# Patient Record
Sex: FEMALE | Race: WHITE | Employment: UNEMPLOYED | URBAN - METROPOLITAN AREA
[De-identification: names, ages, dates, MRNs, and addresses within clinical notes are randomized per-mention and may not be internally consistent; named-entity substitution may affect disease eponyms.]

---

## 2019-08-30 NOTE — H&P (VIEW-ONLY)
Anjel Navarrete  1980                     ASSESSMENT:   1. Left 1.2cm and 1cm distal ureteral stones    2. Right 8mm non-obstructing renal stone    Recommended urgent/semi-elective treatment given she has been obstructed for ~3 weeks now. Consider treating contralateral side if she is ok with possibility of bilateral stents. Urine is negative, will send for culture today to document no growth. PLAN:    1. Plan for next available URS with laser lithotripsy and stone extraction. 2. Urine today sent for culture  3. RTC post-op     Follow-up and Dispositions    · Return for Post-Op. DISCUSSION:  Risks and benefits of ureteroscopy were reviewed including but not limited to infection, bleeding, pain, ureteral injury, persistent stone disease, requirement for staged procedure, likely need for stent, and global anesthesia risks. Reviewed stent removal as well involving in office flexible cystoscopy procedure. Patient expressed understanding and desires to proceed with ureteroscopy. Chief Complaint   Patient presents with    Kidney Stone     rv ct       HISTORY OF PRESENT ILLNESS:  Anjel Navarrete is a 34 y.o. female who is seen in follow up of left distal ureteral stone first on CT scan 8/14/19. CT with two large distal ureteral stones, one measuring 1.2 cm and one measuring ~1cm. Returns today to discuss treatment of her ureteral stones. Today she reports that she has been doing well, no pain since she was seen by Dr. Kilo Maldonado 8/7/19. She has not seen any stones pass. She has passed two stones in the past. She is concerned as she is going on vacation 9/14/19. She denies flank pain, abdominal pain, dysuria, gross hematuria, dysuria, urinary frequency, urinary urgency, or f/c/n/v.       REVIEW OF LABS AND IMAGING:    CT abd/pelv 8/14/19: IMPRESSION     2 large stones in the distal left ureter, larger measuring 12 x 9 mm. Mild hydronephrosis and moderate ureterectasis.  Smaller intrarenal calculi bilaterally. Results for orders placed or performed in visit on 19   AMB POC URINALYSIS DIP STICK AUTO W/O MICRO   Result Value Ref Range    Color (UA POC) Yellow     Clarity (UA POC) Clear     Glucose (UA POC) Negative Negative    Bilirubin (UA POC) Negative Negative    Ketones (UA POC) Negative Negative    Specific gravity (UA POC) 1.020 1.001 - 1.035    Blood (UA POC) Trace Negative    pH (UA POC) 5.5 4.6 - 8.0    Protein (UA POC) Negative Negative    Urobilinogen (UA POC) 0.2 mg/dL 0.2 - 1    Nitrites (UA POC) Negative Negative    Leukocyte esterase (UA POC) Negative Negative       CT Results:  Results from Abstract encounter on 19   CT ABD PELV WO CONT    Impression [address] Cat Scan  [address] [address]  [phone]      Imaging Result     Name:  Chari Mena ([id number])    Sex: Female :  1980 Ordering Provider: Nereyda AMAYA Provider:   Diagnosis:  Kidney stones [N20.0 (ICD-10-CM)]    None Specified    Procedures Performed:  CT ABD/PELVIS-NO ORAL/IV  [id number] Exam Date/Time:  2019 10:29 AM            EXAM: CT ABDOMEN AND PELVIS      CLINICAL INDICATION/HISTORY:  N20.0: Kidney stones.  -Additional: Left-sided nephrolithiasis suspected on prior radiograph.     COMPARISON: Not available.     TECHNIQUE: Axial CT imaging of the abdomen and pelvis was performed without intravenous contrast. Multiplanar reformats were generated. One or more dose reduction techniques were used on this CT: automated exposure control, adjustment of the mAs and/or kVp according to patient size, and iterative reconstruction techniques. The specific techniques used on this CT exam have been documented in the patient's electronic medical record.  Digital Imaging and Communications in Medicine (DICOM) format image data are available to nonaffiliated external healthcare facilities or entities on a secure, media free, reciprocally searchable basis with patient authorization for at least a 12-month period after this study.     _______________     FINDINGS:     LOWER CHEST: Unremarkable.     LIVER, BILIARY: Liver is normal. No biliary dilation. Gallbladder is unremarkable.     SPLEEN: Normal.     PANCREAS: Normal.     ADRENALS: Normal.     KIDNEYS/URETERS/BLADDER:     > 12 x 9 mm stone distal left ureter (image 121), measures less than 1000 HU.    > Slightly more distal left ureteral stone (image 126) measuring 7 mm, greater than 1000 HU. > Associated mild left-sided hydronephrosis and moderate ureterectasis. > 5 mm intrarenal calculus in the upper pole of the left kidney. > 4 mm intrarenal calculus interpolar right kidney with no hydronephrosis.     LYMPH NODES: No enlarged lymph nodes.     GASTROINTESTINAL TRACT: No bowel dilation or wall thickening.     VASCULATURE: Unremarkable.     PELVIC ORGANS: Unremarkable.     BONES: No acute or aggressive osseous abnormalities identified.     OTHER: None.     _______________     IMPRESSION     2 large stones in the distal left ureter, larger measuring 12 x 9 mm. Mild hydronephrosis and moderate ureterectasis. Smaller intrarenal calculi bilaterally.     Signed By: Neel Whipple MD on 8/14/2019 2:45 PM      Dictated by: Richmond Juárez on Wed Aug 14, 2019  2:24:08 PM EDT    Signed By:Coleman Merida MD     8/14/2019  2:45 PM     ~~This report was copied and pasted from the servicing EHR system. ~~         US Results:  No results found for this or any previous visit. No past medical history on file. No past surgical history on file. Social History     Tobacco Use    Smoking status: Never Smoker    Smokeless tobacco: Never Used   Substance Use Topics    Alcohol use: Not Currently    Drug use: Not on file       Allergies   Allergen Reactions    Bactrim [Sulfamethoprim] Other (comments)       No family history on file.     Current Outpatient Medications Medication Sig Dispense Refill    loratadine (CLARITIN) 10 mg tablet Take 10 mg by mouth.  fluticasone propionate (FLONASE ALLERGY RELIEF) 50 mcg/actuation nasal spray 2 Sprays by Both Nostrils route daily. Review of Systems  Constitutional: Fever: No  Skin: Rash: No  HEENT: Hearing difficulty: No  Eyes: Blurred vision: No  Cardiovascular: Chest pain: No  Respiratory: Shortness of breath: No  Gastrointestinal: Nausea/vomiting: No  Musculoskeletal: Back pain: No  Neurological: Weakness: No  Psychological: Memory loss: No  Comments/additional findings:         PHYSICAL EXAMINATION:     Visit Vitals  /60   Ht 5' 1\" (1.549 m)   Wt 105 lb (47.6 kg)   LMP 08/07/2019   BMI 19.84 kg/m²     Constitutional: Well developed, well-nourished female in no acute distress. CV:  No peripheral swelling noted  Respiratory: No respiratory distress or difficulties  Abdomen:  Soft and nontender. No masses. No hepatosplenomegaly.  Female:  No CVA tenderness. Skin:  Normal color. No evidence of jaundice. Neuro/Psych:  Patient with appropriate affect. Alert and oriented. Lymphatic:   No enlargement of supraclavicular lymph nodes. Labs Today:  Recent Results (from the past 12 hour(s))   AMB POC URINALYSIS DIP STICK AUTO W/O MICRO    Collection Time: 08/30/19  4:00 PM   Result Value Ref Range    Color (UA POC) Yellow     Clarity (UA POC) Clear     Glucose (UA POC) Negative Negative    Bilirubin (UA POC) Negative Negative    Ketones (UA POC) Negative Negative    Specific gravity (UA POC) 1.020 1.001 - 1.035    Blood (UA POC) Trace Negative    pH (UA POC) 5.5 4.6 - 8.0    Protein (UA POC) Negative Negative    Urobilinogen (UA POC) 0.2 mg/dL 0.2 - 1    Nitrites (UA POC) Negative Negative    Leukocyte esterase (UA POC) Negative Negative        A copy of today's office visit with all pertinent imaging results and labs were sent to the referring physician.         Panda Pace MD   [facility] [address]        Encounter Diagnoses     ICD-10-CM ICD-9-CM   1. Kidney stone N20.0 592.0          Medical documentation provided with the assistance of Karley Qiu. Yevgeniy Merlos medical scribe for Padma Riley MD on 8/30/2019.

## 2019-09-03 ENCOUNTER — APPOINTMENT (OUTPATIENT)
Dept: GENERAL RADIOLOGY | Age: 39
End: 2019-09-03
Attending: UROLOGY
Payer: COMMERCIAL

## 2019-09-03 ENCOUNTER — ANESTHESIA (OUTPATIENT)
Dept: SURGERY | Age: 39
End: 2019-09-03
Payer: COMMERCIAL

## 2019-09-03 ENCOUNTER — ANESTHESIA EVENT (OUTPATIENT)
Dept: SURGERY | Age: 39
End: 2019-09-03
Payer: COMMERCIAL

## 2019-09-03 ENCOUNTER — HOSPITAL ENCOUNTER (OUTPATIENT)
Age: 39
Setting detail: OUTPATIENT SURGERY
Discharge: HOME | End: 2019-09-03
Attending: UROLOGY | Admitting: UROLOGY
Payer: COMMERCIAL

## 2019-09-03 VITALS
OXYGEN SATURATION: 98 % | HEIGHT: 61 IN | SYSTOLIC BLOOD PRESSURE: 117 MMHG | RESPIRATION RATE: 16 BRPM | DIASTOLIC BLOOD PRESSURE: 65 MMHG | HEART RATE: 97 BPM | BODY MASS INDEX: 19.71 KG/M2 | TEMPERATURE: 99.4 F | WEIGHT: 104.38 LBS

## 2019-09-03 DIAGNOSIS — N20.1 URETEROLITHIASIS: Primary | ICD-10-CM

## 2019-09-03 LAB
ANION GAP SERPL CALC-SCNC: 8 MMOL/L (ref 3–18)
BUN BLD-MCNC: 14 MG/DL (ref 7–18)
BUN BLD-MCNC: 14 MG/DL (ref 7–18)
BUN SERPL-MCNC: 12 MG/DL (ref 7–18)
BUN/CREAT SERPL: 26 (ref 12–20)
CALCIUM SERPL-MCNC: 8.9 MG/DL (ref 8.5–10.1)
CHLORIDE BLD-SCNC: 106 MMOL/L (ref 100–108)
CHLORIDE BLD-SCNC: 106 MMOL/L (ref 100–108)
CHLORIDE SERPL-SCNC: 109 MMOL/L (ref 100–111)
CO2 SERPL-SCNC: 25 MMOL/L (ref 21–32)
CREAT SERPL-MCNC: 0.47 MG/DL (ref 0.6–1.3)
GLUCOSE BLD STRIP.AUTO-MCNC: 88 MG/DL (ref 74–106)
GLUCOSE BLD STRIP.AUTO-MCNC: 89 MG/DL (ref 74–106)
GLUCOSE SERPL-MCNC: 83 MG/DL (ref 74–99)
HCG UR QL: NEGATIVE
HCT VFR BLD CALC: 37 % (ref 36–49)
HCT VFR BLD CALC: 38 % (ref 36–49)
HGB BLD-MCNC: 12.6 G/DL (ref 12–16)
HGB BLD-MCNC: 12.9 G/DL (ref 12–16)
POTASSIUM BLD-SCNC: 6.3 MMOL/L (ref 3.5–5.5)
POTASSIUM BLD-SCNC: 6.3 MMOL/L (ref 3.5–5.5)
POTASSIUM SERPL-SCNC: 4.1 MMOL/L (ref 3.5–5.5)
SODIUM BLD-SCNC: 137 MMOL/L (ref 136–145)
SODIUM BLD-SCNC: 138 MMOL/L (ref 136–145)
SODIUM SERPL-SCNC: 142 MMOL/L (ref 136–145)

## 2019-09-03 PROCEDURE — 74011000250 HC RX REV CODE- 250: Performed by: ANESTHESIOLOGY

## 2019-09-03 PROCEDURE — 82947 ASSAY GLUCOSE BLOOD QUANT: CPT

## 2019-09-03 PROCEDURE — 76060000035 HC ANESTHESIA 2 TO 2.5 HR: Performed by: UROLOGY

## 2019-09-03 PROCEDURE — 76010000171 HC OR TIME 2 TO 2.5 HR INTENSV-TIER 1: Performed by: UROLOGY

## 2019-09-03 PROCEDURE — 76210000020 HC REC RM PH II FIRST 0.5 HR: Performed by: UROLOGY

## 2019-09-03 PROCEDURE — 77030010509 HC AIRWY LMA MSK TELE -A: Performed by: ANESTHESIOLOGY

## 2019-09-03 PROCEDURE — C1758 CATHETER, URETERAL: HCPCS | Performed by: UROLOGY

## 2019-09-03 PROCEDURE — 77030018836 HC SOL IRR NACL ICUM -A: Performed by: UROLOGY

## 2019-09-03 PROCEDURE — 77030012961 HC IRR KT CYSTO/TUR ICUM -A: Performed by: UROLOGY

## 2019-09-03 PROCEDURE — 74011250636 HC RX REV CODE- 250/636

## 2019-09-03 PROCEDURE — 74011250636 HC RX REV CODE- 250/636: Performed by: UROLOGY

## 2019-09-03 PROCEDURE — 82360 CALCULUS ASSAY QUANT: CPT

## 2019-09-03 PROCEDURE — 74011250636 HC RX REV CODE- 250/636: Performed by: ANESTHESIOLOGY

## 2019-09-03 PROCEDURE — 77030019903 HC CATH URET INT BARD -A: Performed by: UROLOGY

## 2019-09-03 PROCEDURE — 76210000006 HC OR PH I REC 0.5 TO 1 HR: Performed by: UROLOGY

## 2019-09-03 PROCEDURE — 77030020268 HC MISC GENERAL SUPPLY: Performed by: UROLOGY

## 2019-09-03 PROCEDURE — 77030019927 HC TBNG IRR CYSTO BAXT -A: Performed by: UROLOGY

## 2019-09-03 PROCEDURE — C1769 GUIDE WIRE: HCPCS | Performed by: UROLOGY

## 2019-09-03 PROCEDURE — 80048 BASIC METABOLIC PNL TOTAL CA: CPT

## 2019-09-03 PROCEDURE — 74011000250 HC RX REV CODE- 250: Performed by: UROLOGY

## 2019-09-03 PROCEDURE — 74011636320 HC RX REV CODE- 636/320: Performed by: UROLOGY

## 2019-09-03 PROCEDURE — 77030020015 HC EVAC BLDR UROVAC BSC -B: Performed by: UROLOGY

## 2019-09-03 PROCEDURE — C2617 STENT, NON-COR, TEM W/O DEL: HCPCS | Performed by: UROLOGY

## 2019-09-03 PROCEDURE — C1894 INTRO/SHEATH, NON-LASER: HCPCS | Performed by: UROLOGY

## 2019-09-03 PROCEDURE — 77030004561 HC CATH ANGI DX COBRA ANGI -B: Performed by: UROLOGY

## 2019-09-03 PROCEDURE — 81025 URINE PREGNANCY TEST: CPT

## 2019-09-03 PROCEDURE — 77030020782 HC GWN BAIR PAWS FLX 3M -B: Performed by: UROLOGY

## 2019-09-03 PROCEDURE — 74011250636 HC RX REV CODE- 250/636: Performed by: NURSE ANESTHETIST, CERTIFIED REGISTERED

## 2019-09-03 PROCEDURE — 77030038846 HC SCPE URETSCP LITHOVUE DISP BSC -H: Performed by: UROLOGY

## 2019-09-03 PROCEDURE — 74420 UROGRAPHY RTRGR +-KUB: CPT

## 2019-09-03 PROCEDURE — 77030006974 HC BSKT URET RTVR BSC -C: Performed by: UROLOGY

## 2019-09-03 DEVICE — URETERAL STENT
Type: IMPLANTABLE DEVICE | Site: URETER | Status: FUNCTIONAL
Brand: POLARIS™ ULTRA

## 2019-09-03 RX ORDER — ONDANSETRON 2 MG/ML
INJECTION INTRAMUSCULAR; INTRAVENOUS AS NEEDED
Status: DISCONTINUED | OUTPATIENT
Start: 2019-09-03 | End: 2019-09-03 | Stop reason: HOSPADM

## 2019-09-03 RX ORDER — DEXAMETHASONE SODIUM PHOSPHATE 4 MG/ML
INJECTION, SOLUTION INTRA-ARTICULAR; INTRALESIONAL; INTRAMUSCULAR; INTRAVENOUS; SOFT TISSUE AS NEEDED
Status: DISCONTINUED | OUTPATIENT
Start: 2019-09-03 | End: 2019-09-03 | Stop reason: HOSPADM

## 2019-09-03 RX ORDER — OXYCODONE AND ACETAMINOPHEN 5; 325 MG/1; MG/1
1-2 TABLET ORAL
Qty: 15 TAB | Refills: 0 | Status: SHIPPED | OUTPATIENT
Start: 2019-09-03 | End: 2019-09-06

## 2019-09-03 RX ORDER — ONDANSETRON 2 MG/ML
4 INJECTION INTRAMUSCULAR; INTRAVENOUS ONCE
Status: COMPLETED | OUTPATIENT
Start: 2019-09-03 | End: 2019-09-03

## 2019-09-03 RX ORDER — PROPOFOL 10 MG/ML
INJECTION, EMULSION INTRAVENOUS AS NEEDED
Status: DISCONTINUED | OUTPATIENT
Start: 2019-09-03 | End: 2019-09-03 | Stop reason: HOSPADM

## 2019-09-03 RX ORDER — FENTANYL CITRATE 50 UG/ML
INJECTION, SOLUTION INTRAMUSCULAR; INTRAVENOUS AS NEEDED
Status: DISCONTINUED | OUTPATIENT
Start: 2019-09-03 | End: 2019-09-03 | Stop reason: HOSPADM

## 2019-09-03 RX ORDER — MIDAZOLAM HYDROCHLORIDE 1 MG/ML
INJECTION, SOLUTION INTRAMUSCULAR; INTRAVENOUS AS NEEDED
Status: DISCONTINUED | OUTPATIENT
Start: 2019-09-03 | End: 2019-09-03 | Stop reason: HOSPADM

## 2019-09-03 RX ORDER — KETOROLAC TROMETHAMINE 15 MG/ML
15 INJECTION, SOLUTION INTRAMUSCULAR; INTRAVENOUS AS NEEDED
Status: COMPLETED | OUTPATIENT
Start: 2019-09-03 | End: 2019-09-03

## 2019-09-03 RX ORDER — LIDOCAINE HYDROCHLORIDE 20 MG/ML
INJECTION, SOLUTION EPIDURAL; INFILTRATION; INTRACAUDAL; PERINEURAL AS NEEDED
Status: DISCONTINUED | OUTPATIENT
Start: 2019-09-03 | End: 2019-09-03 | Stop reason: HOSPADM

## 2019-09-03 RX ORDER — FENTANYL CITRATE 50 UG/ML
50 INJECTION, SOLUTION INTRAMUSCULAR; INTRAVENOUS
Status: DISCONTINUED | OUTPATIENT
Start: 2019-09-03 | End: 2019-09-03 | Stop reason: HOSPADM

## 2019-09-03 RX ORDER — TAMSULOSIN HYDROCHLORIDE 0.4 MG/1
0.4 CAPSULE ORAL DAILY
Qty: 30 CAP | Refills: 3 | Status: SHIPPED | OUTPATIENT
Start: 2019-09-03

## 2019-09-03 RX ORDER — NITROFURANTOIN 25; 75 MG/1; MG/1
100 CAPSULE ORAL 2 TIMES DAILY
Qty: 14 CAP | Refills: 0 | Status: SHIPPED | OUTPATIENT
Start: 2019-09-03 | End: 2019-09-10

## 2019-09-03 RX ORDER — OXYBUTYNIN CHLORIDE 10 MG/1
10 TABLET, EXTENDED RELEASE ORAL
Qty: 20 TAB | Refills: 0 | Status: SHIPPED | OUTPATIENT
Start: 2019-09-03 | End: 2019-10-16

## 2019-09-03 RX ORDER — SODIUM CHLORIDE, SODIUM LACTATE, POTASSIUM CHLORIDE, CALCIUM CHLORIDE 600; 310; 30; 20 MG/100ML; MG/100ML; MG/100ML; MG/100ML
75 INJECTION, SOLUTION INTRAVENOUS CONTINUOUS
Status: DISCONTINUED | OUTPATIENT
Start: 2019-09-03 | End: 2019-09-03 | Stop reason: HOSPADM

## 2019-09-03 RX ADMIN — KETOROLAC TROMETHAMINE 15 MG: 15 INJECTION, SOLUTION INTRAMUSCULAR; INTRAVENOUS at 13:39

## 2019-09-03 RX ADMIN — FENTANYL CITRATE 50 MCG: 50 INJECTION, SOLUTION INTRAMUSCULAR; INTRAVENOUS at 12:58

## 2019-09-03 RX ADMIN — FENTANYL CITRATE 25 MCG: 50 INJECTION, SOLUTION INTRAMUSCULAR; INTRAVENOUS at 12:18

## 2019-09-03 RX ADMIN — ONDANSETRON 4 MG: 2 INJECTION INTRAMUSCULAR; INTRAVENOUS at 13:06

## 2019-09-03 RX ADMIN — FENTANYL CITRATE 25 MCG: 50 INJECTION, SOLUTION INTRAMUSCULAR; INTRAVENOUS at 12:03

## 2019-09-03 RX ADMIN — PROPOFOL 150 MG: 10 INJECTION, EMULSION INTRAVENOUS at 11:27

## 2019-09-03 RX ADMIN — FENTANYL CITRATE 50 MCG: 50 INJECTION, SOLUTION INTRAMUSCULAR; INTRAVENOUS at 11:26

## 2019-09-03 RX ADMIN — FENTANYL CITRATE 50 MCG: 50 INJECTION, SOLUTION INTRAMUSCULAR; INTRAVENOUS at 11:42

## 2019-09-03 RX ADMIN — FENTANYL CITRATE 25 MCG: 50 INJECTION, SOLUTION INTRAMUSCULAR; INTRAVENOUS at 12:33

## 2019-09-03 RX ADMIN — SODIUM CHLORIDE, SODIUM LACTATE, POTASSIUM CHLORIDE, AND CALCIUM CHLORIDE 75 ML/HR: 600; 310; 30; 20 INJECTION, SOLUTION INTRAVENOUS at 10:56

## 2019-09-03 RX ADMIN — MIDAZOLAM HYDROCHLORIDE 2 MG: 1 INJECTION, SOLUTION INTRAMUSCULAR; INTRAVENOUS at 11:22

## 2019-09-03 RX ADMIN — FAMOTIDINE 20 MG: 10 INJECTION, SOLUTION INTRAVENOUS at 10:56

## 2019-09-03 RX ADMIN — LIDOCAINE HYDROCHLORIDE 60 MG: 20 INJECTION, SOLUTION EPIDURAL; INFILTRATION; INTRACAUDAL; PERINEURAL at 11:26

## 2019-09-03 RX ADMIN — ONDANSETRON 4 MG: 2 INJECTION INTRAMUSCULAR; INTRAVENOUS at 13:41

## 2019-09-03 RX ADMIN — DEXAMETHASONE SODIUM PHOSPHATE 4 MG: 4 INJECTION, SOLUTION INTRA-ARTICULAR; INTRALESIONAL; INTRAMUSCULAR; INTRAVENOUS; SOFT TISSUE at 11:34

## 2019-09-03 RX ADMIN — WATER 1 G: 1 INJECTION INTRAMUSCULAR; INTRAVENOUS; SUBCUTANEOUS at 11:30

## 2019-09-03 RX ADMIN — FENTANYL CITRATE 25 MCG: 50 INJECTION, SOLUTION INTRAMUSCULAR; INTRAVENOUS at 12:45

## 2019-09-03 NOTE — INTERVAL H&P NOTE
H&P Update:  Renee Ambrocio was seen and examined. History and physical has been reviewed. The patient has been examined.  There have been no significant clinical changes since the completion of the originally dated History and Physical.

## 2019-09-03 NOTE — ANESTHESIA PREPROCEDURE EVALUATION
Relevant Problems   No relevant active problems       Anesthetic History   No history of anesthetic complications            Review of Systems / Medical History  Patient summary reviewed and pertinent labs reviewed    Pulmonary  Within defined limits                 Neuro/Psych   Within defined limits           Cardiovascular                  Exercise tolerance: >4 METS     GI/Hepatic/Renal         Renal disease: stones       Endo/Other  Within defined limits           Other Findings              Physical Exam    Airway  Mallampati: II  TM Distance: 4 - 6 cm  Neck ROM: normal range of motion   Mouth opening: Normal     Cardiovascular  Regular rate and rhythm,  S1 and S2 normal,  no murmur, click, rub, or gallop             Dental  No notable dental hx       Pulmonary  Breath sounds clear to auscultation               Abdominal  GI exam deferred       Other Findings            Anesthetic Plan    ASA: 1  Anesthesia type: general          Induction: Intravenous  Anesthetic plan and risks discussed with: Patient

## 2019-09-03 NOTE — OP NOTES
Operative Note  Patient: Carrie Luong               Sex: female             MRN: [id number]  YOB: 1980      Age:  34 y.o. Preoperative Diagnosis: bilateral kidney stones  Postoperative Diagnosis:  bilateral kidney stones  Surgeon: Saturnino Peterson     Indication: This is a 33 y/o woman with left 1cm obstructing distal stones x 2 as well as 7mm right mid pole stone here today for bilalateral ureteroscopic stone exctraction. Preop culture was negative. Procedure:    1) Cystoscopy  2) Retrograde pyelogram with interpretation  3) < 1 hour physician fluoroscopy imaging interpretation time  4) Bilateral side Ureteroscopy, laser lithotripsy and stone extraction   5) Bilateral JJ ureteral stent placement     Findings:    1). Normal cystoscopy   2). Retrograde pyelograms with 2 large distal left stones with massive proximal hydro-ureteronephrosis and cork-screwing ureter  3). Total stone burden 2cm on left, 7mm on right    4). 6x22 on right, 6x26 JJ stent placed on left     Narrative of events: The patient was brought to the operative suite. Anesthesia was induced and preoperative antibiotics were administered. They were then placed in the dorsal lithotomy position and their external genitalia was prepped and draped in the usual fashion. A surgical timeout was performed confirming the patient's name, date of birth, laterality, and antibiotics. All were in agreement. A 22 Burmese cystourethroscope was then inserted into the patients bladder. The urethra revealed no abnormalities. Thorough cystoscopy was performed with both the 30 degree and 70 degree lens. The left ureteral orifice was cannulated with a 0.035 sensor tip wire and immediately bounced off the stone. Tried a glide wire which wouldn't pass. Then tried a tigertail with angled glide which wouldn't pass. Eventually using a combo of a kumpe catheter and angled glide wire, was able to get past the stone.   At this point the glide wire curled in the mid ureter loop and again required guidance with a kumpe to gain access. There was yet a 3rd loop in the proximal uretera/UPJ which eventually passed into the renal pelvis. The tigertail was passed over this and then a super stiff wire was advanced to straighten the ureter. At this point, the wire was secured as a safety and rigid ureteroscopy was performed. The stones were engaged in the distal ureter and laser lithotripsy was performed. Basket extraction was then performed. The fragments were all removed. A duel lumen was advanced over this and retrograde pyelogram was performed revealing the hydro was improving and ureter had now been straightened. Second wire was advanced and duel lumen was removed. 13/15 Ureteral access sheath was advanced to the UPJ under direct fluoroscopic guidance and flexible ureteroscopy was performed. Some small lower pole stones were identified and laser lithotripsy was performed. Basket extraction was then performed with removal of all visible stone. Thorough pyeloscopy was again performed to ensure no residual fragments. The sheath was removed slowly and the entire ureter was well visualized without evidence of residual stones or injury. The wire was kept in situ for later stent placement. I then cannulated the right side, used a duel lumen which showed no hydro and second wire was advanced an duel lumen was removed. 11/13 Ureteral access sheath was advanced to the UPJ under direct fluoroscopic guidance and flexible ureteroscopy was performed. The mid pole stone seen on CT was actually embedded in the papilla. A papillotomy was performed and the stone was lasered into fragments. Basket extraction was then performed with removal of all visible stone. Thorough pyeloscopy was again performed to ensure no residual fragments.        The sheath was removed slowly and the entire ureter was well visualized without evidence of residual stones or injury. A 6x22 JJ stent was advanced and deployed with the string left. A second stent was then advanced over the wire on the left side and deployed using fluoroscopic technique with the string left in place. The bladder was drained and patient was awoken from anesthesia. Estimated Blood Loss: <5CC     Anesthesia:  General                  Implants:   Implant Name Type Inv. Item Serial No.  Lot No. LRB No. Used Action   Shane Garcia DBL-PGTL [id number] -- [facility] - [id number]  Shane Garcia DBL-PGTL [id number] -- [facility] [id number] Right 1 Implanted   STENT URET POLARIS 7FR 26CM -- [facility] - [id number]  Shane Moffette POLARIS 7FR 26CM -- PERCUFLEX  [facility] [employer] [id number] Left 1 Implanted       Specimens:   ID Type Source Tests Collected by Time Destination   1 : stone analysis Other   Lexi Randolph MD 9/3/2019  1:13 PM Pathology        Drains: [id number] right stent, 6x26 left stent            Complications:  None           Plan:  1. Macrobid x 7 days  2. Flomax x 30 days  3. Ditropan PRN daily for spasms   4. Pull stent out on 9/8/2019   5.  Return in 6-8 weeks with KUB, UltrasoundBreanna MD  9/3/2019

## 2019-09-03 NOTE — DISCHARGE INSTRUCTIONS
Discharge Instructions    DIET: General     ACTIVITY: No restrictions     ADDITIONAL INFORMATION:   You have indwelling ureteral stents which frequently causes slight discomfort in the flank region and bladder spasms. If these occur beyond 24 hours after surgery, please contact our office to prescribe you flomax as needed for flank discomfort or Ditropan for bladder spasms. The indwelling stent will need to be removed/exchanged as directed below. If the stent is left in place without appropriate follow-up, it may become encrusted with stone and/or infected. If that occurs, you will require multiple additional surgeries to fix this. It is very important you follow up for appropriate removal or exchange of ureteral stents. If you experience any fevers > 100.4, significant nausea/vomiting, or significant worsening of pain, please contact us at the number below. It is common to experience mild, recurrent blood in your urine and this is likely due to stent irritation. If the bleeding continues to worsen with passage of clots, you are unable to urinate, or you experience significant light-headedness, please contact us at the number below. STENT: Your stents are attached to small strings coming out of your urethra. Please remove the stent on Sunday 9/8/2019 , by pulling firmly on the string until the stent is completely recovered. Some patients prefer to do this in a warm tub or shower. After the stent is removed it is common to experience some flank pain. This flank pain should resolve in 24-48 hours and should be manageable with pain medications. If pain is not controlled with pain medications, you experience nausea, vomiting, or fevers please notify us at the number below. FOLLOW UP CARE:  You have a return appointment with Dr. Xenia Nieto in 6 weeks with a 24 hour urine test, blood work, X ray and kidney ultrasound.         DISCHARGE SUMMARY from Nurse    PATIENT INSTRUCTIONS:    After general anesthesia or intravenous sedation, for 24 hours or while taking prescription Narcotics:  · Limit your activities  · Do not drive and operate hazardous machinery  · Do not make important personal or business decisions  · Do  not drink alcoholic beverages  · If you have not urinated within 8 hours after discharge, please contact your surgeon on call. Report the following to your surgeon:  · Excessive pain, swelling, redness or odor of or around the surgical area  · Temperature over 100.5  · Nausea and vomiting lasting longer than 4 hours or if unable to take medications  · Any signs of decreased circulation or nerve impairment to extremity: change in color, persistent  numbness, tingling, coldness or increase pain  · Any questions    *  Please give a list of your current medications to your Primary Care Provider. *  Please update this list whenever your medications are discontinued, doses are      changed, or new medications (including over-the-counter products) are added. *  Please carry medication information at all times in case of emergency situations. These are general instructions for a healthy lifestyle:    No smoking/ No tobacco products/ Avoid exposure to second hand smoke  Surgeon General's Warning:  Quitting smoking now greatly reduces serious risk to your health. Obesity, smoking, and sedentary lifestyle greatly increases your risk for illness    A healthy diet, regular physical exercise & weight monitoring are important for maintaining a healthy lifestyle    You may be retaining fluid if you have a history of heart failure or if you experience any of the following symptoms:  Weight gain of 3 pounds or more overnight or 5 pounds in a week, increased swelling in our hands or feet or shortness of breath while lying flat in bed. Please call your doctor as soon as you notice any of these symptoms; do not wait until your next office visit.         The discharge information has been reviewed with the patient and spouse. The patient and spouse verbalized understanding. Discharge medications reviewed with the patient and spouse and appropriate educational materials and side effects teaching were provided.   ___________________________________________________________________________________________________________________________________

## 2019-09-03 NOTE — ANESTHESIA POSTPROCEDURE EVALUATION
Procedure(s):  CYSTOSCOPYBILATERAL URETEROSCOPY, STONE EXTRACTION, LASER LITHOTRIPSY, BILATERAL STENT PLACEMENT. general    Anesthesia Post Evaluation      Multimodal analgesia: multimodal analgesia used between 6 hours prior to anesthesia start to PACU discharge  Patient location during evaluation: bedside  Patient participation: complete - patient participated  Level of consciousness: awake  Pain management: adequate  Airway patency: patent  Anesthetic complications: no  Cardiovascular status: stable  Respiratory status: acceptable  Hydration status: acceptable  Post anesthesia nausea and vomiting:  controlled      Vitals Value Taken Time   /65 9/3/2019  2:05 PM   Temp 37.4 °C (99.4 °F) 9/3/2019  1:27 PM   Pulse 95 9/3/2019  2:07 PM   Resp 14 9/3/2019  2:07 PM   SpO2 99 % 9/3/2019  2:07 PM   Vitals shown include unvalidated device data.

## 2019-09-11 LAB
CA PHOS MFR STONE: 40 %
CALCIUM OXALATE DIHYDRATE MFR STONE IR: 5 %
COLOR STONE: NORMAL
COM MFR STONE: 55 %
COMMENT, 120677: NORMAL
COMMENT, 519994: NORMAL
COMPOSITION, 120440: NORMAL
DISCLAIMER, STO32L: NORMAL
NIDUS STONE QL: NORMAL
SIZE STONE: NORMAL MM
WT STONE: 221 MG

## (undated) DEVICE — SHEATH URET 36CM OD15FR ID13FR HYDRPHLC 2 TAPR NAVIGATOR

## (undated) DEVICE — CATH URETH INTMIT ROB 10FR FUN -- CONVERT TO ITEM 151712

## (undated) DEVICE — SOLUTION IV 1000ML 0.9% SOD CHL

## (undated) DEVICE — DUAL LUMEN URETERAL CATHETER

## (undated) DEVICE — FLEXIBLE TIP

## (undated) DEVICE — STER SINGLE BASIN SET W/BOWLS: Brand: CARDINAL HEALTH

## (undated) DEVICE — LUB SURG MEDC STRL 2OZ TUBE MC -- MEDICHOICE

## (undated) DEVICE — TRAY PREP DRY W/ PREM GLV 2 APPL 6 SPNG 2 UNDPD 1 OVERWRAP

## (undated) DEVICE — CHECK-FLO ADAPTER: Brand: CHECK-FLO

## (undated) DEVICE — KIT CLN UP BON SECOURS MARYV

## (undated) DEVICE — TUBING IRRIG L77IN DIA0.241IN L BOR FOR CYSTO W/ NVENT

## (undated) DEVICE — GUIDEWIRE: Brand: AMPLATZ SUPER STIFF™

## (undated) DEVICE — Z DUP USE 2565107 PACK SURG PROC LEG CYSTO T-DRAPE REINF TBL CVR HND TWL

## (undated) DEVICE — GAUZE,SPONGE,4"X4",16PLY,STRL,LF,10/TRAY: Brand: MEDLINE

## (undated) DEVICE — EVACUATOR URO BLDR W/ ADPT UROVAC

## (undated) DEVICE — GOWN,PREVENTION PLUS,XLN/XL,ST,24/CS: Brand: MEDLINE

## (undated) DEVICE — Device

## (undated) DEVICE — SINGLE-USE DIGITAL FLEXIBLE URETEROSCOPE: Brand: LITHOVUE

## (undated) DEVICE — UROLOGY SET

## (undated) DEVICE — URETERAL ACCESS SHEATH SET: Brand: NAVIGATOR

## (undated) DEVICE — MEDI-VAC NON-CONDUCTIVE SUCTION TUBING: Brand: CARDINAL HEALTH

## (undated) DEVICE — BAG DRAINAGE CUST DISP

## (undated) DEVICE — SYR 10ML LUER LOK 1/5ML GRAD --

## (undated) DEVICE — NITINOL STONE RETRIEVAL BASKET: Brand: ZERO TIP

## (undated) DEVICE — 3M™ BAIR PAWS FLEX™ WARMING GOWN, STANDARD, 20 PER CASE 81003: Brand: BAIR PAWS™

## (undated) DEVICE — SOLUTION IRRIG 3000ML 0.9% SOD CHL FLX CONT 0797208] ICU MEDICAL INC]

## (undated) DEVICE — GUIDEWIRE ENDOSCP L150CM DIA0.038IN TIP L3CM STD NIT POLYUR

## (undated) DEVICE — CYSTO/BLADDER IRRIGATION SET, REGULATING CLAMP

## (undated) DEVICE — GDWIRE UROL STR 150CM FLX TP -- BX/5 SENSOR